# Patient Record
Sex: FEMALE | Race: OTHER | HISPANIC OR LATINO
[De-identification: names, ages, dates, MRNs, and addresses within clinical notes are randomized per-mention and may not be internally consistent; named-entity substitution may affect disease eponyms.]

---

## 2022-08-10 ENCOUNTER — APPOINTMENT (OUTPATIENT)
Dept: ORTHOPEDIC SURGERY | Facility: CLINIC | Age: 42
End: 2022-08-10

## 2022-08-10 VITALS — HEIGHT: 62 IN | WEIGHT: 162 LBS | BODY MASS INDEX: 29.81 KG/M2

## 2022-08-10 DIAGNOSIS — Z78.9 OTHER SPECIFIED HEALTH STATUS: ICD-10-CM

## 2022-08-10 DIAGNOSIS — J45.909 UNSPECIFIED ASTHMA, UNCOMPLICATED: ICD-10-CM

## 2022-08-10 PROBLEM — Z00.00 ENCOUNTER FOR PREVENTIVE HEALTH EXAMINATION: Status: ACTIVE | Noted: 2022-08-10

## 2022-08-10 PROCEDURE — 20611 DRAIN/INJ JOINT/BURSA W/US: CPT

## 2022-08-10 PROCEDURE — J3490M: CUSTOM

## 2022-08-10 PROCEDURE — 73030 X-RAY EXAM OF SHOULDER: CPT | Mod: LT

## 2022-08-10 PROCEDURE — 99203 OFFICE O/P NEW LOW 30 MIN: CPT | Mod: 25

## 2022-08-10 PROCEDURE — 76881 US COMPL JOINT R-T W/IMG: CPT | Mod: LT

## 2022-08-10 NOTE — PROCEDURE
[Left] : of the left [Shoulder] : shoulder [] : Patient tolerated procedure well [Apply ice for 15min out of every hour for the next 12-24 hours as tolerated] : apply ice for 15 minutes out of every hour for the next 12-24 hours as tolerated [Patient was advised to rest the joint(s) for ____ days] : patient was advised to rest the joint(s) for [unfilled] days [All ultrasound images have been permanently captured and stored accordingly in our picture archiving and communication system] : All ultrasound images have been permanently captured and stored accordingly in our picture archiving and communication system [Pain] : pain [Inflammation] : inflammation

## 2022-08-10 NOTE — HISTORY OF PRESENT ILLNESS
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 PAin with reaching overhead, behind back and sleeping at night. No radicular complaints. She has been taking OTC NSAIDS with mild help, mild help. She feels weak, trouble with any lifting/pushing/pulling.  [] : no [FreeTextEntry1] : left shoulder

## 2022-08-10 NOTE — DISCUSSION/SUMMARY
[de-identified] : Patient allowed to gently start resuming activities.\par Discussed change to medication prescription and usage. \par Offered cortisone steroid injection. \par MRI left shoulder eval labral tear, impingement\par try lido patch\par \par \par  \par \par RE:  BETH CAMPBELL \par \par Acct #- [id number] \par \par \par \par Attention:  Nurse Reviewer /Medical Director\par \par  \par Based on my patient's condition, I strongly believe that the MRI is medically.necessary.  \par The patient has failed oral meds, injections and PT and conservative treatment in combination or by themselves and therefore needs the MRI.  \par The MRI will dictate further treatment t recommendations.\par

## 2022-08-12 ENCOUNTER — FORM ENCOUNTER (OUTPATIENT)
Age: 42
End: 2022-08-12

## 2022-08-13 ENCOUNTER — APPOINTMENT (OUTPATIENT)
Dept: MRI IMAGING | Facility: CLINIC | Age: 42
End: 2022-08-13

## 2022-08-13 PROCEDURE — 73221 MRI JOINT UPR EXTREM W/O DYE: CPT | Mod: LT

## 2022-08-25 ENCOUNTER — APPOINTMENT (OUTPATIENT)
Dept: ORTHOPEDIC SURGERY | Facility: CLINIC | Age: 42
End: 2022-08-25

## 2022-08-25 VITALS — WEIGHT: 162 LBS | HEIGHT: 62 IN | BODY MASS INDEX: 29.81 KG/M2

## 2022-08-25 PROCEDURE — 99214 OFFICE O/P EST MOD 30 MIN: CPT

## 2022-08-25 NOTE — DATA REVIEWED
[MRI] : MRI [Left] : left [Shoulder] : shoulder [Report was reviewed and noted in the chart] : The report was reviewed and noted in the chart [I reviewed the films/CD and agree] : I reviewed the films/CD and agree [FreeTextEntry1] : bursitis and strain RTC

## 2022-08-25 NOTE — DISCUSSION/SUMMARY
[de-identified] : Patient allowed to gently start resuming activities.\par Discussed change to medication prescription and usage. \par Offered cortisone steroid injection. \par MRI left shoulder eval labral tear, impingement\par try lido patch\par \par \par  \par \par RE:  BETH CAMPBELL \par \par Acct #- [id number] \par \par \par \par Attention:  Nurse Reviewer /Medical Director\par \par  \par Based on my patient's condition, I strongly believe that the MRI is medically.necessary.  \par The patient has failed oral meds, injections and PT and conservative treatment in combination or by themselves and therefore needs the MRI.  \par The MRI will dictate further treatment t recommendations.\par

## 2022-08-25 NOTE — PHYSICAL EXAM
[Left] : left shoulder [5___] : internal rotation 5[unfilled]/5 [] : motor and sensory intact distally [TWNoteComboBox7] : False [TWNoteComboBox6] : False [de-identified] : False

## 2022-08-25 NOTE — HISTORY OF PRESENT ILLNESS
[de-identified] : she feels much better after injection and had mRI [8] : 8 [5] : 5 [Localized] : localized [Throbbing] : throbbing [Tingling] : tingling [] : no [FreeTextEntry1] : left shoulder

## 2024-04-15 ENCOUNTER — APPOINTMENT (OUTPATIENT)
Dept: ORTHOPEDIC SURGERY | Facility: CLINIC | Age: 44
End: 2024-04-15
Payer: COMMERCIAL

## 2024-04-15 VITALS — HEIGHT: 62 IN | WEIGHT: 162 LBS | BODY MASS INDEX: 29.81 KG/M2

## 2024-04-15 DIAGNOSIS — M77.8 OTHER ENTHESOPATHIES, NOT ELSEWHERE CLASSIFIED: ICD-10-CM

## 2024-04-15 PROCEDURE — 99214 OFFICE O/P EST MOD 30 MIN: CPT | Mod: 25

## 2024-04-15 PROCEDURE — 20611 DRAIN/INJ JOINT/BURSA W/US: CPT | Mod: LT

## 2024-04-15 PROCEDURE — 73030 X-RAY EXAM OF SHOULDER: CPT | Mod: LT

## 2024-04-15 PROCEDURE — J3490M: CUSTOM

## 2024-04-15 NOTE — PHYSICAL EXAM
[Sitting] : sitting [Moderate] : moderate [Left] : left shoulder [There are no fractures, subluxations or dislocations. No significant abnormalities are seen] : There are no fractures, subluxations or dislocations. No significant abnormalities are seen [] : no swelling [TWNoteComboBox7] : active forward flexion 170 degrees [de-identified] : external rotation 60 degrees [TWNoteComboBox6] : internal rotation L3

## 2024-04-15 NOTE — HISTORY OF PRESENT ILLNESS
[10] : 10 [8] : 8 [Burning] : burning [Dull/Aching] : dull/aching [Frequent] : frequent [Leisure] : leisure [Sleep] : sleep [Rest] : rest [Meds] : meds [Heat] : heat [de-identified] : 44 year old RHD female with pain in the left shoulder. Symptoms started about 4 months ago, pain with reaching over head, behind back and sleeping atnight. Occasional symptoms into the upper arm. No recent injury. She has had pain in this shoulder several years ago, MRI in 2022 showed partial tear RTC, mils AC arthrosis. She had CSI then with help. Tried acupuncture [] : no [FreeTextEntry1] : Left Shoulder [FreeTextEntry3] : about 4 months [FreeTextEntry5] : Patient BETH CAMPBELL is 44 years old and is being evaluated for the LEFT SHOULDER after experiencing pain. Patient has a history of shoulder pain. [FreeTextEntry7] : down the arm [FreeTextEntry9] : ibuprofen [de-identified] : Lifting

## 2024-04-15 NOTE — DISCUSSION/SUMMARY
[de-identified] : modify activities use elastic sleeve try OTC meds ice as needed try topical lidocaine reviewed current medications used by this patient will try csi

## 2024-04-15 NOTE — PROCEDURE
[Large Joint Injection] : Large joint injection [Left] : of the left [Shoulder] : shoulder [] : Patient tolerated procedure well [Call if redness, pain or fever occur] : call if redness, pain or fever occur [Apply ice for 15min out of every hour for the next 12-24 hours as tolerated] : apply ice for 15 minutes out of every hour for the next 12-24 hours as tolerated [Patient was advised to rest the joint(s) for ____ days] : patient was advised to rest the joint(s) for [unfilled] days [All ultrasound images have been permanently captured and stored accordingly in our picture archiving and communication system] : All ultrasound images have been permanently captured and stored accordingly in our picture archiving and communication system [Pain] : pain [Inflammation] : inflammation [FreeTextEntry3] : Large Joint Injection / Aspiration: Celestone, Lidocaine, Marcaine and Guidance Ultrasound Large Joint Injection was performed because of pain and inflammation. Anesthesia: ethyl chloride sprayed topically..  Celestone: An injection of Celestone 12 mg , 2 cc. Needle size: 22 gauge , 1.5 inch.  Lidocaine: 3 cc.  Marcaine: 3 cc.   Medication was injected in the left shoulder. Patient has tried OTC's including aspirin, Ibuprofen, Aleve etc or prescription NSAIDS, and/or exercises at home and/ or physical therapy without satisfactory response. After verbal consent using sterile preparation and technique. The risks, benefits, and alternatives to cortisone injection were explained in full to the patient. Risks outlined include but are not limited to infection, sepsis, bleeding, scarring, skin discoloration, temporary increase in pain, syncopal episode, failure to resolve symptoms, allergic reaction, symptom recurrence, and elevation of blood sugar in diabetics. Patient understood the risks. All questions were answered. After discussion of options, patient requested an injection. Oral informed consent was obtained and sterile prep was done of the injection site. Sterile technique was utilized for the procedure including the preparation of the solutions used for the injection. Patient tolerated the procedure well. Advised to ice the injection site this evening. Prep with betadine locally to site. Sterile technique used. Patient tolerated procedure well. Post Procedure Instructions: Patient was advised to call if redness, pain, or fever occur and apply ice for 15 min. out of every hour for the next 12-24 hours as tolerated. patient was advised to rest the joint(s) for 1 days.   Ultrasound Guidance was used for the following reasons: for Glenohumeral injection.   Ultrasound guided injection was performed of the shoulder, visualization of the needle and placement of injection was performed without complication.

## 2024-12-12 ENCOUNTER — APPOINTMENT (OUTPATIENT)
Dept: ORTHOPEDIC SURGERY | Facility: CLINIC | Age: 44
End: 2024-12-12
Payer: COMMERCIAL

## 2024-12-12 VITALS — HEIGHT: 62 IN | BODY MASS INDEX: 27.6 KG/M2 | WEIGHT: 150 LBS

## 2024-12-12 DIAGNOSIS — M51.369: ICD-10-CM

## 2024-12-12 DIAGNOSIS — M54.42 LUMBAGO WITH SCIATICA, LEFT SIDE: ICD-10-CM

## 2024-12-12 PROCEDURE — 99214 OFFICE O/P EST MOD 30 MIN: CPT

## 2024-12-12 PROCEDURE — 72170 X-RAY EXAM OF PELVIS: CPT

## 2024-12-12 PROCEDURE — 72100 X-RAY EXAM L-S SPINE 2/3 VWS: CPT

## 2024-12-12 PROCEDURE — G2211 COMPLEX E/M VISIT ADD ON: CPT | Mod: NC

## 2024-12-12 RX ORDER — MELOXICAM 15 MG/1
15 TABLET ORAL
Qty: 30 | Refills: 1 | Status: ACTIVE | COMMUNITY
Start: 2024-12-12 | End: 1900-01-01

## 2024-12-12 RX ORDER — MONTELUKAST 10 MG/1
TABLET, FILM COATED ORAL
Refills: 0 | Status: ACTIVE | COMMUNITY

## 2024-12-12 RX ORDER — TIRZEPATIDE 5 MG/.5ML
INJECTION, SOLUTION SUBCUTANEOUS
Refills: 0 | Status: ACTIVE | COMMUNITY

## 2024-12-13 ENCOUNTER — APPOINTMENT (OUTPATIENT)
Dept: MRI IMAGING | Facility: CLINIC | Age: 44
End: 2024-12-13
Payer: COMMERCIAL

## 2024-12-13 PROCEDURE — 72148 MRI LUMBAR SPINE W/O DYE: CPT

## 2024-12-27 ENCOUNTER — APPOINTMENT (OUTPATIENT)
Dept: PAIN MANAGEMENT | Facility: CLINIC | Age: 44
End: 2024-12-27
Payer: COMMERCIAL

## 2024-12-27 DIAGNOSIS — M54.42 LUMBAGO WITH SCIATICA, LEFT SIDE: ICD-10-CM

## 2024-12-27 DIAGNOSIS — M51.369: ICD-10-CM

## 2024-12-27 PROCEDURE — J3490M: CUSTOM

## 2024-12-27 PROCEDURE — 99244 OFF/OP CNSLTJ NEW/EST MOD 40: CPT | Mod: 25

## 2024-12-27 PROCEDURE — 20552 NJX 1/MLT TRIGGER POINT 1/2: CPT

## 2025-01-22 ENCOUNTER — APPOINTMENT (OUTPATIENT)
Age: 45
End: 2025-01-22
Payer: COMMERCIAL

## 2025-01-22 PROCEDURE — 62323 NJX INTERLAMINAR LMBR/SAC: CPT

## 2025-02-07 ENCOUNTER — APPOINTMENT (OUTPATIENT)
Dept: PAIN MANAGEMENT | Facility: CLINIC | Age: 45
End: 2025-02-07
Payer: COMMERCIAL

## 2025-02-07 VITALS — BODY MASS INDEX: 27.79 KG/M2 | HEIGHT: 62 IN | WEIGHT: 151 LBS

## 2025-02-07 DIAGNOSIS — M51.369: ICD-10-CM

## 2025-02-07 DIAGNOSIS — M54.17 RADICULOPATHY, LUMBOSACRAL REGION: ICD-10-CM

## 2025-02-07 DIAGNOSIS — M54.42 LUMBAGO WITH SCIATICA, LEFT SIDE: ICD-10-CM

## 2025-02-07 PROCEDURE — 99213 OFFICE O/P EST LOW 20 MIN: CPT

## 2025-03-14 ENCOUNTER — RX RENEWAL (OUTPATIENT)
Age: 45
End: 2025-03-14

## 2025-04-23 ENCOUNTER — APPOINTMENT (OUTPATIENT)
Age: 45
End: 2025-04-23
Payer: COMMERCIAL

## 2025-04-23 PROCEDURE — 62323 NJX INTERLAMINAR LMBR/SAC: CPT

## 2025-05-09 ENCOUNTER — APPOINTMENT (OUTPATIENT)
Dept: PAIN MANAGEMENT | Facility: CLINIC | Age: 45
End: 2025-05-09
Payer: COMMERCIAL

## 2025-05-09 VITALS — BODY MASS INDEX: 26.87 KG/M2 | WEIGHT: 146 LBS | HEIGHT: 62 IN

## 2025-05-09 DIAGNOSIS — M51.369: ICD-10-CM

## 2025-05-09 DIAGNOSIS — M54.17 RADICULOPATHY, LUMBOSACRAL REGION: ICD-10-CM

## 2025-05-09 PROCEDURE — 99213 OFFICE O/P EST LOW 20 MIN: CPT
